# Patient Record
Sex: MALE | Race: WHITE
[De-identification: names, ages, dates, MRNs, and addresses within clinical notes are randomized per-mention and may not be internally consistent; named-entity substitution may affect disease eponyms.]

---

## 2018-04-25 ENCOUNTER — HOSPITAL ENCOUNTER (EMERGENCY)
Dept: HOSPITAL 62 - ER | Age: 48
Discharge: HOME | End: 2018-04-25
Payer: COMMERCIAL

## 2018-04-25 VITALS — SYSTOLIC BLOOD PRESSURE: 139 MMHG | DIASTOLIC BLOOD PRESSURE: 74 MMHG

## 2018-04-25 DIAGNOSIS — Z88.2: ICD-10-CM

## 2018-04-25 DIAGNOSIS — M25.521: ICD-10-CM
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
## 2018-04-25 DIAGNOSIS — F17.200: ICD-10-CM

## 2018-04-25 DIAGNOSIS — S39.012A: ICD-10-CM

## 2018-04-25 DIAGNOSIS — J44.9: ICD-10-CM

## 2018-04-25 DIAGNOSIS — S16.1XXA: Primary | ICD-10-CM

## 2018-04-25 DIAGNOSIS — M62.830: ICD-10-CM

## 2018-04-25 DIAGNOSIS — R03.0: ICD-10-CM

## 2018-04-25 DIAGNOSIS — S50.01XA: ICD-10-CM

## 2018-04-25 DIAGNOSIS — Z88.0: ICD-10-CM

## 2018-04-25 DIAGNOSIS — M54.2: ICD-10-CM

## 2018-04-25 PROCEDURE — 72110 X-RAY EXAM L-2 SPINE 4/>VWS: CPT

## 2018-04-25 PROCEDURE — 73080 X-RAY EXAM OF ELBOW: CPT

## 2018-04-25 PROCEDURE — 99283 EMERGENCY DEPT VISIT LOW MDM: CPT

## 2018-04-25 PROCEDURE — 96372 THER/PROPH/DIAG INJ SC/IM: CPT

## 2018-04-25 PROCEDURE — 72070 X-RAY EXAM THORAC SPINE 2VWS: CPT
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
